# Patient Record
Sex: MALE | Race: ASIAN | NOT HISPANIC OR LATINO
[De-identification: names, ages, dates, MRNs, and addresses within clinical notes are randomized per-mention and may not be internally consistent; named-entity substitution may affect disease eponyms.]

---

## 2018-05-02 ENCOUNTER — PRIOR ORIGINAL RECORDS (OUTPATIENT)
Dept: OTHER | Age: 63
End: 2018-05-02

## 2018-10-17 ENCOUNTER — PRIOR ORIGINAL RECORDS (OUTPATIENT)
Dept: OTHER | Age: 63
End: 2018-10-17

## 2018-12-31 ENCOUNTER — PRIOR ORIGINAL RECORDS (OUTPATIENT)
Dept: OTHER | Age: 63
End: 2018-12-31

## 2019-02-15 ENCOUNTER — OFFICE VISIT (OUTPATIENT)
Dept: OTOLARYNGOLOGY | Facility: CLINIC | Age: 64
End: 2019-02-15
Payer: MEDICARE

## 2019-02-15 VITALS
DIASTOLIC BLOOD PRESSURE: 87 MMHG | TEMPERATURE: 97 F | HEIGHT: 64 IN | BODY MASS INDEX: 24.75 KG/M2 | SYSTOLIC BLOOD PRESSURE: 148 MMHG | WEIGHT: 145 LBS

## 2019-02-15 DIAGNOSIS — K11.20 SIALADENITIS: Primary | ICD-10-CM

## 2019-02-15 RX ORDER — CEPHALEXIN 500 MG/1
500 CAPSULE ORAL EVERY 8 HOURS
Qty: 30 CAPSULE | Refills: 0 | Status: SHIPPED | OUTPATIENT
Start: 2019-02-15

## 2019-02-15 NOTE — PROGRESS NOTES
Jaquan Hernandez is a 70year old male. Patient presents with:  Neck Pain: c/o neck pain for 2 days    HPI:   He was noted to have a discoloration under his tongue. The not giving him any difficulty. It seemed to get slightly better.   A few days ago started Submandibular. Anterior cervical. Posterior cervical. Supraclavicular.    Eyes Normal Conjunctiva - Right: Normal, Left: Normal. Pupil - Right: Normal, Left: Normal.    Ears Normal Inspection - Right: Normal, Left: Normal. Canal - Left: Normal. TM - Right:

## 2019-03-08 ENCOUNTER — PRIOR ORIGINAL RECORDS (OUTPATIENT)
Dept: OTHER | Age: 64
End: 2019-03-08

## 2019-05-06 ENCOUNTER — OFFICE VISIT (OUTPATIENT)
Dept: OTOLARYNGOLOGY | Facility: CLINIC | Age: 64
End: 2019-05-06
Payer: MEDICARE

## 2019-05-06 VITALS
BODY MASS INDEX: 24.75 KG/M2 | WEIGHT: 145 LBS | SYSTOLIC BLOOD PRESSURE: 127 MMHG | DIASTOLIC BLOOD PRESSURE: 72 MMHG | HEART RATE: 68 BPM | HEIGHT: 64 IN

## 2019-05-06 DIAGNOSIS — J04.0 LARYNGITIS: ICD-10-CM

## 2019-05-06 DIAGNOSIS — K11.20 SIALADENITIS: Primary | ICD-10-CM

## 2019-05-07 NOTE — PROGRESS NOTES
Keo Saenz is a 75year old male. Patient presents with:  Throat Problem: f/u sialadentis, per pt still some discomfort, pt feels like something is stuck in his throat     HPI:   He continues to experience a fullness in his throat.   He has pain in his t or firm hard areas. Psychiatric Normal Orientation - Oriented to time, place, person & situation. Appropriate mood and affect. Lymph Detail Normal Submental. Submandibular. Anterior cervical. Posterior cervical. Supraclavicular.    Eyes Normal Conjuncti

## 2019-07-16 ENCOUNTER — OFFICE VISIT (OUTPATIENT)
Dept: OTOLARYNGOLOGY | Facility: CLINIC | Age: 64
End: 2019-07-16
Payer: MEDICARE

## 2019-07-16 VITALS
DIASTOLIC BLOOD PRESSURE: 75 MMHG | WEIGHT: 145 LBS | BODY MASS INDEX: 24.75 KG/M2 | TEMPERATURE: 97 F | HEIGHT: 64 IN | SYSTOLIC BLOOD PRESSURE: 151 MMHG

## 2019-07-16 DIAGNOSIS — M54.2 NECK PAIN: Primary | ICD-10-CM

## 2019-07-16 RX ORDER — OMEPRAZOLE 20 MG/1
20 CAPSULE, DELAYED RELEASE ORAL
COMMUNITY

## 2019-07-16 NOTE — PROGRESS NOTES
Nette Soto is a 75year old male.  Patient presents with:  Throat Problem: pt states having discomfort in throat area, some jaw discomfort as well     HPI:   He does feel that the neck is improved slightly but he still has some discomfort in the right si enlarged right submandibular gland compared to the left. No obvious masses otherwise seen   Psychiatric Normal Orientation - Oriented to time, place, person & situation. Appropriate mood and affect. Lymph Detail Normal Submental. Submandibular.  Anterior

## 2019-08-01 ENCOUNTER — PRIOR ORIGINAL RECORDS (OUTPATIENT)
Dept: OTHER | Age: 64
End: 2019-08-01

## 2019-09-20 ENCOUNTER — OFFICE VISIT (OUTPATIENT)
Dept: OTOLARYNGOLOGY | Facility: CLINIC | Age: 64
End: 2019-09-20
Payer: MEDICARE

## 2019-09-20 VITALS
SYSTOLIC BLOOD PRESSURE: 153 MMHG | WEIGHT: 145 LBS | BODY MASS INDEX: 24.75 KG/M2 | DIASTOLIC BLOOD PRESSURE: 82 MMHG | TEMPERATURE: 97 F | HEIGHT: 64 IN

## 2019-09-20 DIAGNOSIS — M54.2 NECK PAIN: Primary | ICD-10-CM

## 2019-09-20 NOTE — PROGRESS NOTES
Clementina Esquivel is a 75year old male. Patient presents with: Follow - Up: 2 month follow up- neck pain-per pt no changes/improvement of symptoms    HPI:   He is in follow-up of a CT scan of his neck.   I reviewed the CT scan myself and the findings with him cervical. Supraclavicular.    Eyes Normal Conjunctiva - Right: Normal, Left: Normal. Pupil - Right: Normal, Left: Normal.    Ears Normal Inspection - Right: Normal, Left: Normal. Canal - Left: Normal. TM - Right: Normal, Left: Normal.     ASSESSMENT AND PAIGE

## 2019-11-02 ENCOUNTER — HOSPITAL (OUTPATIENT)
Dept: OTHER | Age: 64
End: 2019-11-02

## 2019-11-02 ENCOUNTER — DIAGNOSTIC TRANS (OUTPATIENT)
Dept: OTHER | Age: 64
End: 2019-11-02

## 2019-11-02 LAB
ALBUMIN SERPL-MCNC: 3.3 G/DL (ref 3.6–5.1)
ALBUMIN/GLOB SERPL: 0.9 {RATIO} (ref 1–2.4)
ALP SERPL-CCNC: 179 UNITS/L (ref 45–117)
ALT SERPL-CCNC: 69 UNITS/L
AMORPH SED URNS QL MICRO: ABNORMAL
ANALYZER ANC (IANC): ABNORMAL
ANION GAP SERPL CALC-SCNC: 11 MMOL/L (ref 10–20)
APPEARANCE UR: CLEAR
AST SERPL-CCNC: 54 UNITS/L
BACTERIA #/AREA URNS HPF: ABNORMAL /HPF
BASOPHILS # BLD: 0 K/MCL (ref 0–0.3)
BASOPHILS NFR BLD: 0 %
BILIRUB SERPL-MCNC: 1.1 MG/DL (ref 0.2–1)
BILIRUB UR QL: NEGATIVE
BUN SERPL-MCNC: 14 MG/DL (ref 6–20)
BUN/CREAT SERPL: 16 (ref 7–25)
CALCIUM SERPL-MCNC: 9.3 MG/DL (ref 8.4–10.2)
CAOX CRY URNS QL MICRO: ABNORMAL
CHLORIDE SERPL-SCNC: 105 MMOL/L (ref 98–107)
CO2 SERPL-SCNC: 25 MMOL/L (ref 21–32)
COLOR UR: YELLOW
CREAT SERPL-MCNC: 0.87 MG/DL (ref 0.67–1.17)
CRP SERPL-MCNC: 12.3 MG/DL
D DIMER PPP FEU-MCNC: 0.51 MG/L (FEU)
D DIMER PPP FEU-MCNC: NORMAL
DIFFERENTIAL METHOD BLD: ABNORMAL
EOSINOPHIL # BLD: 0 K/MCL (ref 0.1–0.5)
EOSINOPHIL NFR BLD: 0 %
EPITH CASTS #/AREA URNS LPF: ABNORMAL /[LPF]
ERYTHROCYTE [DISTWIDTH] IN BLOOD: 14.5 % (ref 11–15)
FATTY CASTS #/AREA URNS LPF: ABNORMAL /[LPF]
GLOBULIN SER-MCNC: 3.6 G/DL (ref 2–4)
GLUCOSE SERPL-MCNC: 116 MG/DL (ref 65–99)
GLUCOSE UR-MCNC: NEGATIVE MG/DL
GRAN CASTS #/AREA URNS LPF: ABNORMAL /[LPF]
HCT VFR BLD CALC: 39.4 % (ref 39–51)
HGB BLD-MCNC: 13.2 G/DL (ref 13–17)
HGB UR QL: ABNORMAL
HYALINE CASTS #/AREA URNS LPF: ABNORMAL /LPF (ref 0–5)
INR PPP: 1
INR PPP: NORMAL
KETONES UR-MCNC: NEGATIVE MG/DL
LEUKOCYTE ESTERASE UR QL STRIP: NEGATIVE
LYMPHOCYTES # BLD: 14.5 K/MCL (ref 1–4)
LYMPHOCYTES NFR BLD: 58 %
MCH RBC QN AUTO: 27.3 PG (ref 26–34)
MCHC RBC AUTO-ENTMCNC: 33.5 G/DL (ref 32–36.5)
MCV RBC AUTO: 81.4 FL (ref 78–100)
MIXED CELL CASTS #/AREA URNS LPF: ABNORMAL /[LPF]
MONOCYTES # BLD: 0.8 K/MCL (ref 0.3–0.9)
MONOCYTES NFR BLD: 3 %
MUCOUS THREADS URNS QL MICRO: ABNORMAL
NEUTROPHILS # BLD: 9.8 K/MCL (ref 1.8–7.7)
NEUTS SEG NFR BLD: 39 %
NITRITE UR QL: NEGATIVE
NRBC (NRBCRE): 0 /100 WBC
NT-PROBNP SERPL-MCNC: 212 PG/ML
PATH REV BLD -IMP: ABNORMAL
PH UR: 5 UNITS (ref 5–7)
PLAT MORPH BLD: NORMAL
PLATELET # BLD: 279 K/MCL (ref 140–450)
POTASSIUM SERPL-SCNC: 4 MMOL/L (ref 3.4–5.1)
PROT SERPL-MCNC: 6.9 G/DL (ref 6.4–8.2)
PROT UR QL: NEGATIVE MG/DL
PROTHROMBIN TIME (PRT2): NORMAL
PROTHROMBIN TIME: 10.3 SEC (ref 9.7–11.8)
RBC # BLD: 4.84 MIL/MCL (ref 4.5–5.9)
RBC #/AREA URNS HPF: ABNORMAL /HPF (ref 0–2)
RBC CASTS #/AREA URNS LPF: ABNORMAL /[LPF]
RBC MORPH BLD: NORMAL
RENAL EPI CELLS #/AREA URNS HPF: ABNORMAL /[HPF]
SODIUM SERPL-SCNC: 137 MMOL/L (ref 135–145)
SP GR UR: 1.02 (ref 1–1.03)
SPECIMEN SOURCE: ABNORMAL
SPERM URNS QL MICRO: ABNORMAL
SQUAMOUS #/AREA URNS HPF: ABNORMAL /HPF (ref 0–5)
T VAGINALIS URNS QL MICRO: ABNORMAL
TRI-PHOS CRY URNS QL MICRO: ABNORMAL
TROPONIN I SERPL HS-MCNC: <0.02 NG/ML
URATE CRY URNS QL MICRO: ABNORMAL
URINE REFLEX: ABNORMAL
URNS CMNT MICRO: ABNORMAL
UROBILINOGEN UR QL: 0.2 MG/DL (ref 0–1)
WAXY CASTS #/AREA URNS LPF: ABNORMAL /[LPF]
WBC # BLD: 25 K/MCL (ref 4.2–11)
WBC #/AREA URNS HPF: ABNORMAL /HPF (ref 0–5)
WBC CASTS #/AREA URNS LPF: ABNORMAL /[LPF]
WBC MORPH BLD: NORMAL
YEAST HYPHAE URNS QL MICRO: ABNORMAL
YEAST URNS QL MICRO: ABNORMAL

## 2019-11-03 ENCOUNTER — DIAGNOSTIC TRANS (OUTPATIENT)
Dept: OTHER | Age: 64
End: 2019-11-03

## 2019-11-03 LAB
2009 H1N1 SUBTYPE (RF1N1): NOT DETECTED
ADENOVIRUS (RADENO): NOT DETECTED
ALBUMIN SERPL-MCNC: 3 G/DL (ref 3.6–5.1)
ALBUMIN/GLOB SERPL: 0.8 {RATIO} (ref 1–2.4)
ALP SERPL-CCNC: 167 UNITS/L (ref 45–117)
ALT SERPL-CCNC: 56 UNITS/L
ANION GAP SERPL CALC-SCNC: 11 MMOL/L (ref 10–20)
AST SERPL-CCNC: 38 UNITS/L
BILIRUB SERPL-MCNC: 1.1 MG/DL (ref 0.2–1)
BOCAVIRUS (RBOCA): NOT DETECTED
BUN SERPL-MCNC: 16 MG/DL (ref 6–20)
BUN/CREAT SERPL: 18 (ref 7–25)
C. PNEUMONIAE (RCHLP): NOT DETECTED
CALCIUM SERPL-MCNC: 9 MG/DL (ref 8.4–10.2)
CHLORIDE SERPL-SCNC: 102 MMOL/L (ref 98–107)
CO2 SERPL-SCNC: 27 MMOL/L (ref 21–32)
CORONAVIRUS 229E (RC229E): NOT DETECTED
CORONAVIRUS HKU1 (RCHKU1): NOT DETECTED
CORONAVIRUS NL63 (RCNL63): NOT DETECTED
CORONAVIRUS OC43 (RCO43): NOT DETECTED
CREAT SERPL-MCNC: 0.87 MG/DL (ref 0.67–1.17)
GLOBULIN SER-MCNC: 3.9 G/DL (ref 2–4)
GLUCOSE SERPL-MCNC: 101 MG/DL (ref 65–99)
INFLUENZA A SUBTYPE H1 (RFLH1): NOT DETECTED
INFLUENZA A SUBTYPE H3 (RFLH3): NOT DETECTED
INFLUENZA A UNSUBTYPABLE (RIAU): NORMAL
INFLUENZA B VIRUS (RFLUB): NOT DETECTED
M. PNEUMONIAE (RMYPP): NORMAL
M. PNEUMONIAE (RMYPP): NOT DETECTED
METAPNEUMOVIRUS (RMETA): NOT DETECTED
PARAINFLUENZA, TYPE 1 (RPAR1): NOT DETECTED
PARAINFLUENZA, TYPE 2 (RPAR2): NOT DETECTED
PARAINFLUENZA, TYPE 3 (RPAR3): NOT DETECTED
PARAINFLUENZA, TYPE 4 (RPAR4): NOT DETECTED
POTASSIUM SERPL-SCNC: 3.7 MMOL/L (ref 3.4–5.1)
PROT SERPL-MCNC: 6.9 G/DL (ref 6.4–8.2)
RHINOVIRUS/ENTEROVIRUS (RRHINO): NOT DETECTED
RSV, SUBTYPE A (RRSVA): NOT DETECTED
RSV, SUBTYPE B (RRSVB): NOT DETECTED
SODIUM SERPL-SCNC: 136 MMOL/L (ref 135–145)
SPECIMEN SOURCE: NORMAL

## 2019-11-03 PROCEDURE — 99222 1ST HOSP IP/OBS MODERATE 55: CPT | Performed by: INTERNAL MEDICINE

## 2019-11-04 ENCOUNTER — HOSPITAL (OUTPATIENT)
Dept: OTHER | Age: 64
End: 2019-11-04

## 2019-11-04 LAB
ALBUMIN SMEAR (DALB): ABNORMAL
ANA SER QL IA: NEGATIVE
ANA SER QL IA: NORMAL
ANALYZER ANC (IANC): ABNORMAL
ANNOTATION COMMENT IMP: NEGATIVE
ANNOTATION COMMENT IMP: NORMAL
ANNOTATION COMMENT IMP: NORMAL
BASOPHILS # BLD: 0 K/MCL (ref 0–0.3)
BASOPHILS NFR BLD: 0 %
BASOPHILS NFR BRONCH MANUAL: NORMAL %
CK SERPL-CCNC: 350 UNITS/L (ref 39–308)
CRP SERPL-MCNC: 13.2 MG/DL
DIFFERENTIAL METHOD BLD: ABNORMAL
EOSINOPHIL # BLD: 0.2 K/MCL (ref 0.1–0.5)
EOSINOPHIL NFR BLD: 1 %
EOSINOPHIL NFR BRONCH MANUAL: NORMAL %
ERYTHROCYTE [DISTWIDTH] IN BLOOD: 14.4 % (ref 11–15)
ERYTHROCYTE [SEDIMENTATION RATE] IN BLOOD BY WESTERGREN METHOD: 81 MM/HR (ref 0–20)
GAMMA INTERFERON BACKGROUND BLD IA-ACNC: 0.05 IU/ML
HCT VFR BLD CALC: 38.8 % (ref 39–51)
HGB BLD-MCNC: 13.6 G/DL (ref 13–17)
IGA SERPL-MCNC: 51 MG/DL (ref 82–453)
IGG SERPL-MCNC: 457 MG/DL (ref 751–1560)
IGM SERPL-MCNC: 13 MG/DL (ref 46–304)
LDH SERPL L TO P-CCNC: 389 UNITS/L (ref 86–234)
LEGIONELLA AB TITR SER IF: NORMAL {TITER}
LYMPHOCYTES # BLD: 14.4 K/MCL (ref 1–4)
LYMPHOCYTES NFR BLD: 47 %
LYMPHOCYTES NFR BRONCH MANUAL: 22 %
M PNEUMO DNA SPEC QL NAA+PROBE: NORMAL
M PNEUMO DNA SPEC QL NAA+PROBE: NOT DETECTED
M TB IFN-G CD4+ BCKGRND COR BLD-ACNC: 0 IU/ML
M TB IFN-G CD4+CD8+ BCKGRND COR BLD-ACNC: 0.02 IU/ML
MCH RBC QN AUTO: 28.4 PG (ref 26–34)
MCHC RBC AUTO-ENTMCNC: 35.1 G/DL (ref 32–36.5)
MCV RBC AUTO: 81 FL (ref 78–100)
MITOGEN IGNF BCKGRD COR BLD-ACNC: 0.69 IU/ML
MONOCYTES # BLD: 0.2 K/MCL (ref 0.3–0.9)
MONOCYTES NFR BLD: 1 %
MONOS+MACROS NFR BRONCH MANUAL: 60 %
MYELOPEROXIDASE AB SER-ACNC: <0.2 AI (ref 0–0.9)
NEUTROPHILS # BLD: 9.5 K/MCL (ref 1.8–7.7)
NEUTS SEG NFR BLD: 39 %
NEUTS SEG NFR FLD: 18 %
NRBC (NRBCRE): 0 /100 WBC
NUC CELL # BRONCH MANUAL: 682 /MCL (ref 0–1000)
OTHER CELLS NFR BRONCH MANUAL: NORMAL %
PATH REV BLD -IMP: ABNORMAL
PATH REV BLD -IMP: NORMAL
PATH REV BLD -IMP: NORMAL
PATHOLOGIST NAME: NORMAL
PLAT MORPH BLD: NORMAL
PLATELET # BLD: 287 K/MCL (ref 140–450)
PROCALCITONIN SERPL IA-MCNC: 0.07 NG/ML
PROCALCITONIN SERPL IA-MCNC: NORMAL NG/ML
PROTEINASE3 AB SER-ACNC: <0.2 AI (ref 0–0.9)
RBC # BLD: 4.79 MIL/MCL (ref 4.5–5.9)
RBC MORPH BLD: NORMAL
RHEUMATOID FACT SER NEPH-ACNC: <10 UNITS/ML
SMUDGE CELLS (SMUD): PRESENT
SPECIMEN SOURCE: NORMAL
SPECIMEN SOURCE: NORMAL
STREP PNEUMONIAE ANTIGEN DETECTION: NORMAL
VARIANT LYMPHS NFR BLD: 12 % (ref 0–5)
WBC # BLD: 24.4 K/MCL (ref 4.2–11)
WBC MORPH BLD: ABNORMAL

## 2019-11-04 PROCEDURE — 99232 SBSQ HOSP IP/OBS MODERATE 35: CPT | Performed by: INTERNAL MEDICINE

## 2019-11-05 LAB
2009 H1N1 SUBTYPE (RF1N1): NOT DETECTED
ADENOVIRUS (RADENO): NOT DETECTED
ALBUMIN SERPL-MCNC: 2.6 G/DL (ref 3.6–5.1)
ALBUMIN SMEAR (DALB): ABNORMAL
ALBUMIN/GLOB SERPL: 0.6 {RATIO} (ref 1–2.4)
ALP SERPL-CCNC: 151 UNITS/L (ref 45–117)
ALT SERPL-CCNC: 42 UNITS/L
ANALYZER ANC (IANC): ABNORMAL
ANION GAP SERPL CALC-SCNC: 10 MMOL/L (ref 10–20)
AST SERPL-CCNC: 35 UNITS/L
BASOPHILS # BLD: 0 K/MCL (ref 0–0.3)
BASOPHILS NFR BLD: 0 %
BILIRUB SERPL-MCNC: 1.6 MG/DL (ref 0.2–1)
BOCAVIRUS (RBOCA): NOT DETECTED
BUN SERPL-MCNC: 13 MG/DL (ref 6–20)
BUN/CREAT SERPL: 14 (ref 7–25)
C. PNEUMONIAE (RCHLP): NOT DETECTED
CALCIUM SERPL-MCNC: 9 MG/DL (ref 8.4–10.2)
CCP AB SER IA-ACNC: 2 UNITS
CCP AB SER IA-ACNC: NORMAL
CHLORIDE SERPL-SCNC: 101 MMOL/L (ref 98–107)
CO2 SERPL-SCNC: 30 MMOL/L (ref 21–32)
CORONAVIRUS 229E (RC229E): NOT DETECTED
CORONAVIRUS HKU1 (RCHKU1): NOT DETECTED
CORONAVIRUS NL63 (RCNL63): NOT DETECTED
CORONAVIRUS OC43 (RCO43): NOT DETECTED
CREAT SERPL-MCNC: 0.9 MG/DL (ref 0.67–1.17)
DIFFERENTIAL METHOD BLD: ABNORMAL
EOSINOPHIL # BLD: 0.5 K/MCL (ref 0.1–0.5)
EOSINOPHIL NFR BLD: 2 %
ERYTHROCYTE [DISTWIDTH] IN BLOOD: 14.7 % (ref 11–15)
GLOBULIN SER-MCNC: 4.6 G/DL (ref 2–4)
GLUCOSE SERPL-MCNC: 101 MG/DL (ref 65–99)
HCT VFR BLD CALC: 35.5 % (ref 39–51)
HGB BLD-MCNC: 12.1 G/DL (ref 13–17)
HIV 1+2 AB+HIV1 P24 AG SERPL QL IA: NONREACTIVE
IFE WITH IGG IGA IGM INTERPRETATION (SIFEMXCPRPT): NORMAL
IFE WITH IGG IGA IGM INTERPRETATION (SIFEMXCPRPT): NORMAL
IGA SERPL-MCNC: 52 MG/DL (ref 82–453)
IGG SERPL-MCNC: 441 MG/DL (ref 751–1560)
IGM SERPL-MCNC: 13 MG/DL (ref 46–304)
INFLUENZA A SUBTYPE H1 (RFLH1): NOT DETECTED
INFLUENZA A SUBTYPE H3 (RFLH3): NOT DETECTED
INFLUENZA A UNSUBTYPABLE (RIAU): NORMAL
INFLUENZA B VIRUS (RFLUB): NOT DETECTED
KAPPA LC FREE SER NEPH-MCNC: 1.01 MG/DL (ref 0.33–1.94)
KAPPA LC FREE SER NEPH-MCNC: ABNORMAL MG/ML
KAPPA LC/LAMBDA SER: 0.84 {RATIO} (ref 0.26–1.65)
L PNEUMO DNA SPEC QL NAA+PROBE: NORMAL
L PNEUMO DNA SPEC QL NAA+PROBE: NOT DETECTED
LAMBDA LC FREE SER NEPH-MCNC: 1.2 MG/DL (ref 0.57–2.63)
LAMBDA LC FREE SER NEPH-MCNC: ABNORMAL MG/ML
LYMPHOCYTES # BLD: 15 K/MCL (ref 1–4)
LYMPHOCYTES NFR BLD: 52 %
M PNEUMO DNA SPEC QL NAA+PROBE: NORMAL
M PNEUMO DNA SPEC QL NAA+PROBE: NOT DETECTED
M. PNEUMONIAE (RMYPP): NORMAL
M. PNEUMONIAE (RMYPP): NOT DETECTED
MCH RBC QN AUTO: 28.1 PG (ref 26–34)
MCHC RBC AUTO-ENTMCNC: 34.1 G/DL (ref 32–36.5)
MCV RBC AUTO: 82.6 FL (ref 78–100)
METAPNEUMOVIRUS (RMETA): NOT DETECTED
MONOCYTES # BLD: 0.3 K/MCL (ref 0.3–0.9)
MONOCYTES NFR BLD: 1 %
NEUTROPHILS # BLD: 11 K/MCL (ref 1.8–7.7)
NEUTS SEG NFR BLD: 41 %
NRBC (NRBCRE): 0 /100 WBC
PARAINFLUENZA, TYPE 1 (RPAR1): NOT DETECTED
PARAINFLUENZA, TYPE 2 (RPAR2): NOT DETECTED
PARAINFLUENZA, TYPE 3 (RPAR3): NOT DETECTED
PARAINFLUENZA, TYPE 4 (RPAR4): NOT DETECTED
PATH REV BLD -IMP: ABNORMAL
PATH SERP REV: ABNORMAL
PLAT MORPH BLD: NORMAL
PLATELET # BLD: 304 K/MCL (ref 140–450)
POTASSIUM SERPL-SCNC: 4.1 MMOL/L (ref 3.4–5.1)
PROT SERPL-MCNC: 7.2 G/DL (ref 6.4–8.2)
RBC # BLD: 4.3 MIL/MCL (ref 4.5–5.9)
RBC MORPH BLD: NORMAL
RHINOVIRUS/ENTEROVIRUS (RRHINO): NOT DETECTED
RSV, SUBTYPE A (RRSVA): NOT DETECTED
RSV, SUBTYPE B (RRSVB): NOT DETECTED
SMUDGE CELLS (SMUD): PRESENT
SODIUM SERPL-SCNC: 137 MMOL/L (ref 135–145)
SPECIMEN SOURCE: NORMAL
VARIANT LYMPHS NFR BLD: 4 % (ref 0–5)
WBC # BLD: 26.8 K/MCL (ref 4.2–11)
WBC MORPH BLD: ABNORMAL

## 2019-11-05 PROCEDURE — 99232 SBSQ HOSP IP/OBS MODERATE 35: CPT | Performed by: INTERNAL MEDICINE

## 2019-11-06 LAB
CHOLEST SERPL-MCNC: 164 MG/DL
CHOLEST SERPL-MCNC: ABNORMAL MG/DL
CHOLEST/HDLC SERPL: 5 {RATIO}
ERYTHROCYTE [SEDIMENTATION RATE] IN BLOOD BY WESTERGREN METHOD: 114 MM/HR (ref 0–20)
HDLC SERPL-MCNC: 33 MG/DL
HDLC SERPL-MCNC: ABNORMAL MG/DL
LDLC SERPL CALC-MCNC: 110 MG/DL
LDLC SERPL CALC-MCNC: ABNORMAL MG/DL
NONHDLC SERPL-MCNC: 131 MG/DL
NONHDLC SERPL-MCNC: ABNORMAL MG/DL
PATH REPORT, CYTOLOGY: NORMAL
PNEUMOCYSTIS SPEC MICRO: NORMAL
RESPIRATORY CUL/SMR (RTCS) HL: NORMAL
T4 FREE SERPL-MCNC: 1.2 NG/DL (ref 0.8–1.5)
T4 FREE SERPL-MCNC: NORMAL NG/DL
T4 SERPL-MCNC: 10.8 MCG/DL (ref 4.7–13.3)
TRIGL SERPL-MCNC: 105 MG/DL
TRIGL SERPL-MCNC: ABNORMAL MG/DL
TSH SERPL-ACNC: 5.78 MCUNITS/ML (ref 0.35–5)
TSH SERPL-ACNC: ABNORMAL M[IU]/L

## 2019-11-06 PROCEDURE — 99232 SBSQ HOSP IP/OBS MODERATE 35: CPT | Performed by: INTERNAL MEDICINE

## 2019-11-07 LAB
CMV DNA # BLD NAA+PROBE: NOT DETECTED IUNITS/ML
CMV DNA SERPL NAA+PROBE-LOG#: NORMAL {LOG_COPIES}/ML
CMV DNA SERPL NAA+PROBE-LOG#: NOT DETECTED IUNITS/ML
GALACTOMANNAN AG SERPL IA-ACNC: NEGATIVE
GALACTOMANNAN AG SERPL IA-ACNC: NORMAL
GALACTOMANNAN AG SPEC IA-ACNC: 0.05
H CAPSUL AG UR QL IA: NORMAL
H CAPSUL AG UR QL IA: NOT DETECTED
H CAPSUL AG UR-MCNC: NORMAL NG/ML
H CAPSUL AG UR-MCNC: NOT DETECTED NG/ML
REF LAB NAME: NORMAL
REF LAB TEST NAME: NORMAL
REF LAB TEST RESULTS: 0.08
REF LAB TEST RESULTS: NORMAL
REFERENCE RANGE: NORMAL
SPECIMEN SOURCE: NORMAL

## 2019-11-08 LAB
BACTERIA BLD CULT: NORMAL

## 2019-11-13 LAB
ASPERGILLUS AB SER QL ID: NEGATIVE
ASPERGILLUS AB SER QL ID: NORMAL
B DERMAT AB SER QL ID: NEGATIVE
B DERMAT AB SER QL ID: NORMAL
COCCIDIOIDES AB SER QL ID: NEGATIVE
COCCIDIOIDES AB SER QL ID: NORMAL
H CAPSUL AB SER QL ID: NEGATIVE
H CAPSUL AB SER QL ID: NORMAL

## 2019-11-21 LAB
AFB PCR (16SAFB): NORMAL

## 2019-12-02 LAB
FUNGAL CULTURE/SMEAR (FNCS) HL: NORMAL

## 2019-12-17 LAB
MYCOBACTERIA CUL/SMR (CAFBS) HL: NORMAL

## 2020-10-09 LAB
ALBUMIN/GLOB SERPL: 2 (CALC) (ref 1–2.5)
ALBUMIN/GLOB SERPL: 2.3 (CALC) (ref 1–2.5)
ALBUMIN/GLOB SERPL: 2.5 (CALC) (ref 1–2.5)
ALBUMIN: 4.2 G/DL (ref 3.6–5.1)
ALBUMIN: 4.5 G/DL (ref 3.6–5.1)
ALBUMIN: 4.7 G/DL (ref 3.6–5.1)
ALKALINE PHOSPHATASE: 66 UNIT/L (ref 40–115)
ALKALINE PHOSPHATASE: 71 UNIT/L (ref 40–115)
ALKALINE PHOSPHATASE: 71 UNIT/L (ref 40–115)
ALT: 19 UNIT/L (ref 9–46)
ALT: 19 UNIT/L (ref 9–46)
ALT: 23 UNIT/L (ref 9–46)
AST: 19 UNIT/L (ref 10–35)
AST: 21 UNIT/L (ref 10–35)
AST: 26 UNIT/L (ref 10–35)
BASO%: 0.1 %
BASO%: 0.1 %
BASO: 0 10^3/UL
BASO: 0 10^3/UL
BILIRUBIN, TOTAL: 0.7 MG/DL (ref 0.2–1.2)
BILIRUBIN, TOTAL: 0.8 MG/DL (ref 0.2–1.2)
BILIRUBIN, TOTAL: 0.9 MG/DL (ref 0.2–1.2)
BUN/CREATININE RATIO: NORMAL (CALC) (ref 6–22)
CALCIUM: 9.1 MG/DL (ref 8.6–10.3)
CALCIUM: 9.3 MG/DL (ref 8.6–10.3)
CALCIUM: 9.3 MG/DL (ref 8.6–10.3)
CARBON DIOXIDE: 23 MMOL/L (ref 20–31)
CARBON DIOXIDE: 24 MMOL/L (ref 20–32)
CARBON DIOXIDE: 26 MMOL/L (ref 20–32)
CHLORIDE: 100 MMOL/L (ref 98–110)
CHLORIDE: 103 MMOL/L (ref 98–110)
CHLORIDE: 107 MMOL/L (ref 98–110)
CHOL/HDLC RATIO: 4.4 (CALC)
CHOLESTEROL, TOTAL: 194 MG/DL
CRCL (C&G) (MOSAIQ HL): 65.01 ML/MIN
CRCL (C&G) (MOSAIQ HL): 70.73 ML/MIN
CRCL (C&G) (MOSAIQ HL): 70.93 ML/MIN
CREATININE CLEARANCE (MOSAIQ HL): 56.1 ML/MIN
CREATININE CLEARANCE (MOSAIQ HL): 60.2 ML/MIN
CREATININE CLEARANCE (MOSAIQ HL): 61.4 ML/MIN
CREATININE: 1.01 MG/DL (ref 0.7–1.25)
CREATININE: 1.03 MG/DL (ref 0.7–1.25)
CREATININE: 1.09 MG/DL (ref 0.7–1.25)
EGFR AFRICAN AMERICAN: 82 ML/MIN/1.73M2
EGFR AFRICAN AMERICAN: 88 ML/MIN/1.73M2
EGFR AFRICAN AMERICAN: 90 ML/MIN/1.73M2
EGFR NON-AFR. AMERICAN: 70 ML/MIN/1.73M2
EGFR NON-AFR. AMERICAN: 76 ML/MIN/1.73M2
EGFR NON-AFR. AMERICAN: 78 ML/MIN/1.73M2
EOS%: 0.6 %
EOS%: 0.6 %
EOS: 0.1 10^3/UL
EOS: 0.2 10^3/UL
GLOBULIN: 1.9 G/DL (CALC) (ref 1.9–3.7)
GLOBULIN: 2 G/DL (CALC) (ref 1.9–3.7)
GLOBULIN: 2.1 G/DL (CALC) (ref 1.9–3.7)
GLUCOSE: 82 MG/DL (ref 65–99)
GLUCOSE: 94 MG/DL (ref 65–99)
GLUCOSE: 99 MG/DL (ref 65–99)
HCT: 42.3 % (ref 38–54)
HCT: 43.1 % (ref 38–54)
HDL CHOLESTEROL: 44 MG/DL
HEPATITIS A IGM ANTIBODY: NORMAL
HEPATITIS B CORE IGM ANTIBODY: NORMAL
HEPATITIS B SURFACE ANTIGEN: NORMAL
HEPATITIS C ANTIBODY: NORMAL
HGB: 14.4 G/DL (ref 12–18)
HGB: 14.6 G/DL (ref 12–18)
LD: 154 UNIT/L (ref 120–250)
LD: 178 UNIT/L (ref 120–250)
LDL-CHOLESTEROL: 132 MG/DL (CALC)
LYMPH%: 77.8 % (ref 12–44)
LYMPH: 24 10^3/UL (ref 0.8–2.8)
MCH: 28.1 PG (ref 26–33)
MCH: 28.9 PG (ref 26–33)
MCHC: 33.4 G/DL (ref 31–36)
MCHC: 34.5 G/DL (ref 31–36)
MCV: 83.8 FML (ref 82–100)
MCV: 84.2 FML (ref 82–100)
MONO%: 2.7 % (ref 2–12)
MONO: 0.8 10^3/UL (ref 0.2–1)
MPV: 10 FML (ref 8.6–11.7)
MPV: 10.1 FML (ref 8.6–11.7)
NEUT%: 18.8 % (ref 47–76)
NEUT: 5.8 10^3/UL (ref 1.5–7.1)
NON-HDL CHOLESTEROL: 150 MG/DL (CALC)
PLT: 161 10^3/UL (ref 150–375)
PLT: 186 10^3/UL (ref 150–375)
POTASSIUM: 4.2 MMOL/L (ref 3.5–5.3)
POTASSIUM: 4.6 MMOL/L (ref 3.5–5.3)
POTASSIUM: 4.6 MMOL/L (ref 3.5–5.3)
PROTEIN, TOTAL: 6.3 G/DL (ref 6.1–8.1)
PROTEIN, TOTAL: 6.5 G/DL (ref 6.1–8.1)
PROTEIN, TOTAL: 6.6 G/DL (ref 6.1–8.1)
PSA, TOTAL: 1.6 NG/ML
RBC: 5.05 10^6/UL (ref 4.2–6.2)
RBC: 5.12 10^6/UL (ref 4.2–6.2)
RDW-CV: 15.9 %
RDW-CV: 15.9 %
RDW-SD: 47.5 FML (ref 36–50)
RDW-SD: 48.4 FML (ref 36–50)
SIGNAL TO CUT-OFF: 0.01
SODIUM: 138 MMOL/L (ref 135–146)
SODIUM: 141 MMOL/L (ref 135–146)
SODIUM: 143 MMOL/L (ref 135–146)
T-4, FREE: 1 NG/DL (ref 0.8–1.8)
TRIGLYCERIDES: 82 MG/DL
TSH, 3RD GENERATION: 5.91 MIU/L (ref 0.4–4.5)
UREA NITROGEN (BUN): 15 MG/DL (ref 7–25)
UREA NITROGEN (BUN): 15 MG/DL (ref 7–25)
UREA NITROGEN (BUN): 22 MG/DL (ref 7–25)
VITAMIN B12: 377 PG/ML (ref 200–1100)
VITAMIN D, 25-OH, TOTAL: 39 NG/ML (ref 30–100)
WBC: 23.9 10^3/UL (ref 4.3–11)
WBC: 30.9 10^3/UL (ref 4.3–11)

## 2020-10-11 VITALS
HEIGHT: 65 IN | DIASTOLIC BLOOD PRESSURE: 71 MMHG | BODY MASS INDEX: 25.33 KG/M2 | SYSTOLIC BLOOD PRESSURE: 132 MMHG | WEIGHT: 152.01 LBS

## 2020-10-11 VITALS
WEIGHT: 151.61 LBS | BODY MASS INDEX: 25.26 KG/M2 | HEIGHT: 65 IN | DIASTOLIC BLOOD PRESSURE: 74 MMHG | SYSTOLIC BLOOD PRESSURE: 136 MMHG

## 2020-10-11 VITALS — WEIGHT: 152.01 LBS | DIASTOLIC BLOOD PRESSURE: 78 MMHG | SYSTOLIC BLOOD PRESSURE: 148 MMHG

## 2020-12-31 ENCOUNTER — PRIOR ORIGINAL RECORDS (OUTPATIENT)
Dept: OTHER | Age: 65
End: 2020-12-31